# Patient Record
Sex: MALE | Race: BLACK OR AFRICAN AMERICAN | NOT HISPANIC OR LATINO | URBAN - METROPOLITAN AREA
[De-identification: names, ages, dates, MRNs, and addresses within clinical notes are randomized per-mention and may not be internally consistent; named-entity substitution may affect disease eponyms.]

---

## 2019-05-04 ENCOUNTER — OFFICE VISIT - HEALTHEAST (OUTPATIENT)
Dept: FAMILY MEDICINE | Facility: CLINIC | Age: 69
End: 2019-05-04

## 2019-05-04 DIAGNOSIS — M25.561 ACUTE PAIN OF RIGHT KNEE: ICD-10-CM

## 2019-06-12 ENCOUNTER — COMMUNICATION - HEALTHEAST (OUTPATIENT)
Dept: ADMINISTRATIVE | Facility: CLINIC | Age: 69
End: 2019-06-12

## 2020-01-01 ENCOUNTER — AMBULATORY - HEALTHEAST (OUTPATIENT)
Dept: VASCULAR SURGERY | Facility: CLINIC | Age: 70
End: 2020-01-01

## 2020-01-01 ENCOUNTER — RECORDS - HEALTHEAST (OUTPATIENT)
Dept: ADMINISTRATIVE | Facility: OTHER | Age: 70
End: 2020-01-01

## 2020-01-01 ENCOUNTER — COMMUNICATION - HEALTHEAST (OUTPATIENT)
Dept: SURGERY | Facility: HOSPITAL | Age: 70
End: 2020-01-01

## 2020-01-01 ENCOUNTER — COMMUNICATION - HEALTHEAST (OUTPATIENT)
Dept: VASCULAR SURGERY | Facility: CLINIC | Age: 70
End: 2020-01-01

## 2020-01-01 ENCOUNTER — AMBULATORY - HEALTHEAST (OUTPATIENT)
Dept: CARE COORDINATION | Facility: CLINIC | Age: 70
End: 2020-01-01

## 2020-01-01 ENCOUNTER — COMMUNICATION - HEALTHEAST (OUTPATIENT)
Dept: NURSING | Facility: CLINIC | Age: 70
End: 2020-01-01

## 2020-01-01 ENCOUNTER — ANESTHESIA - HEALTHEAST (OUTPATIENT)
Dept: INTENSIVE CARE | Facility: CLINIC | Age: 70
End: 2020-01-01

## 2020-01-01 ENCOUNTER — COMMUNICATION - HEALTHEAST (OUTPATIENT)
Dept: INTERNAL MEDICINE | Facility: CLINIC | Age: 70
End: 2020-01-01

## 2020-01-01 ENCOUNTER — ANESTHESIA - HEALTHEAST (OUTPATIENT)
Dept: SURGERY | Facility: HOSPITAL | Age: 70
End: 2020-01-01

## 2020-01-01 ENCOUNTER — OFFICE VISIT - HEALTHEAST (OUTPATIENT)
Dept: INTERNAL MEDICINE | Facility: CLINIC | Age: 70
End: 2020-01-01

## 2020-01-01 ENCOUNTER — COMMUNICATION - HEALTHEAST (OUTPATIENT)
Dept: SCHEDULING | Facility: CLINIC | Age: 70
End: 2020-01-01

## 2020-01-01 ENCOUNTER — AMBULATORY - HEALTHEAST (OUTPATIENT)
Dept: FAMILY MEDICINE | Facility: CLINIC | Age: 70
End: 2020-01-01

## 2020-01-01 ENCOUNTER — OFFICE VISIT - HEALTHEAST (OUTPATIENT)
Dept: FAMILY MEDICINE | Facility: CLINIC | Age: 70
End: 2020-01-01

## 2020-01-01 ENCOUNTER — AMBULATORY - HEALTHEAST (OUTPATIENT)
Dept: INTENSIVE CARE | Facility: CLINIC | Age: 70
End: 2020-01-01

## 2020-01-01 ENCOUNTER — OFFICE VISIT - HEALTHEAST (OUTPATIENT)
Dept: VASCULAR SURGERY | Facility: CLINIC | Age: 70
End: 2020-01-01

## 2020-01-01 ENCOUNTER — RECORDS - HEALTHEAST (OUTPATIENT)
Dept: INTENSIVE CARE | Facility: CLINIC | Age: 70
End: 2020-01-01

## 2020-01-01 ENCOUNTER — SURGERY - HEALTHEAST (OUTPATIENT)
Dept: SURGERY | Facility: HOSPITAL | Age: 70
End: 2020-01-01

## 2020-01-01 ENCOUNTER — COMMUNICATION - HEALTHEAST (OUTPATIENT)
Dept: FAMILY MEDICINE | Facility: CLINIC | Age: 70
End: 2020-01-01

## 2020-01-01 ENCOUNTER — HOME CARE/HOSPICE - HEALTHEAST (OUTPATIENT)
Dept: HOME HEALTH SERVICES | Facility: HOME HEALTH | Age: 70
End: 2020-01-01

## 2020-01-01 ENCOUNTER — AMBULATORY - HEALTHEAST (OUTPATIENT)
Dept: SURGERY | Facility: HOSPITAL | Age: 70
End: 2020-01-01

## 2020-01-01 ENCOUNTER — SURGERY - HEALTHEAST (OUTPATIENT)
Dept: GASTROENTEROLOGY | Facility: CLINIC | Age: 70
End: 2020-01-01

## 2020-01-01 ENCOUNTER — COMMUNICATION - HEALTHEAST (OUTPATIENT)
Dept: CARE COORDINATION | Facility: CLINIC | Age: 70
End: 2020-01-01

## 2020-01-01 ENCOUNTER — RECORDS - HEALTHEAST (OUTPATIENT)
Dept: VASCULAR ULTRASOUND | Facility: CLINIC | Age: 70
End: 2020-01-01

## 2020-01-01 DIAGNOSIS — Z99.2 ESRD NEEDING DIALYSIS (H): ICD-10-CM

## 2020-01-01 DIAGNOSIS — E11.22 TYPE 2 DIABETES MELLITUS WITH STAGE 5 CHRONIC KIDNEY DISEASE NOT ON CHRONIC DIALYSIS, WITHOUT LONG-TERM CURRENT USE OF INSULIN (H): ICD-10-CM

## 2020-01-01 DIAGNOSIS — N25.81 HYPERPARATHYROIDISM DUE TO RENAL INSUFFICIENCY (H): ICD-10-CM

## 2020-01-01 DIAGNOSIS — T82.858A STENOSIS OF OTHER VASCULAR PROSTHETIC DEVICES, IMPLANTS AND GRAFTS, INITIAL ENCOUNTER (H): ICD-10-CM

## 2020-01-01 DIAGNOSIS — N13.8 BENIGN PROSTATIC HYPERPLASIA WITH URINARY OBSTRUCTION: ICD-10-CM

## 2020-01-01 DIAGNOSIS — N18.6 ESRD NEEDING DIALYSIS (H): ICD-10-CM

## 2020-01-01 DIAGNOSIS — R04.2 HEMOPTYSIS: ICD-10-CM

## 2020-01-01 DIAGNOSIS — D63.1 ANEMIA OF CHRONIC RENAL FAILURE, STAGE 5 (H): ICD-10-CM

## 2020-01-01 DIAGNOSIS — N18.6 ESRD (END STAGE RENAL DISEASE) (H): ICD-10-CM

## 2020-01-01 DIAGNOSIS — N18.5 TYPE 2 DIABETES MELLITUS WITH STAGE 5 CHRONIC KIDNEY DISEASE NOT ON CHRONIC DIALYSIS, WITHOUT LONG-TERM CURRENT USE OF INSULIN (H): ICD-10-CM

## 2020-01-01 DIAGNOSIS — I25.10 ARTERIOSCLEROSIS OF CORONARY ARTERY: ICD-10-CM

## 2020-01-01 DIAGNOSIS — Z99.2 DEPENDENCE ON RENAL DIALYSIS (H): ICD-10-CM

## 2020-01-01 DIAGNOSIS — I51.89 DIASTOLIC DYSFUNCTION: ICD-10-CM

## 2020-01-01 DIAGNOSIS — N18.5 ANEMIA OF CHRONIC RENAL FAILURE, STAGE 5 (H): ICD-10-CM

## 2020-01-01 DIAGNOSIS — N18.5 HYPERTENSIVE KIDNEY DISEASE, STAGE V (H): ICD-10-CM

## 2020-01-01 DIAGNOSIS — Z22.7 LATENT TUBERCULOSIS: ICD-10-CM

## 2020-01-01 DIAGNOSIS — N18.6 END STAGE RENAL DISEASE (H): ICD-10-CM

## 2020-01-01 DIAGNOSIS — Z01.818 ENCOUNTER FOR OTHER PREPROCEDURAL EXAMINATION: ICD-10-CM

## 2020-01-01 DIAGNOSIS — I12.0 HYPERTENSIVE KIDNEY DISEASE, STAGE V (H): ICD-10-CM

## 2020-01-01 DIAGNOSIS — H40.003 GLAUCOMA SUSPECT, BILATERAL: ICD-10-CM

## 2020-01-01 DIAGNOSIS — N40.1 BENIGN PROSTATIC HYPERPLASIA WITH URINARY OBSTRUCTION: ICD-10-CM

## 2020-01-01 DIAGNOSIS — N18.5 CHRONIC KIDNEY DISEASE, STAGE 5 (H): ICD-10-CM

## 2020-01-01 DIAGNOSIS — K59.09 OTHER CONSTIPATION: ICD-10-CM

## 2020-01-01 DIAGNOSIS — Z99.2: ICD-10-CM

## 2020-01-01 DIAGNOSIS — I95.1 ORTHOSTATIC HYPOTENSION: ICD-10-CM

## 2020-01-01 DIAGNOSIS — I12.0 HYPERTENSIVE CHRONIC KIDNEY DISEASE WITH STAGE 5 CHRONIC KIDNEY DISEASE OR END STAGE RENAL DISEASE (H): ICD-10-CM

## 2020-01-01 DIAGNOSIS — Z11.59 ENCOUNTER FOR SCREENING FOR OTHER VIRAL DISEASES: ICD-10-CM

## 2020-01-01 DIAGNOSIS — R73.9 HYPERGLYCEMIA: ICD-10-CM

## 2020-01-01 DIAGNOSIS — Z01.818 PREOP GENERAL PHYSICAL EXAM: ICD-10-CM

## 2020-01-01 DIAGNOSIS — N25.81 SECONDARY HYPERPARATHYROIDISM OF RENAL ORIGIN (H): ICD-10-CM

## 2020-01-01 DIAGNOSIS — R01.1 HEART MURMUR: ICD-10-CM

## 2020-01-01 DIAGNOSIS — R94.31 ABNORMAL ELECTROCARDIOGRAM: ICD-10-CM

## 2020-01-01 DIAGNOSIS — D63.1 ANEMIA IN CHRONIC KIDNEY DISEASE (CODE): ICD-10-CM

## 2020-01-01 DIAGNOSIS — I25.10 ATHEROSCLEROTIC HEART DISEASE OF NATIVE CORONARY ARTERY WITHOUT ANGINA PECTORIS: ICD-10-CM

## 2020-01-01 DIAGNOSIS — E87.20 METABOLIC ACIDOSIS: ICD-10-CM

## 2020-01-01 DIAGNOSIS — E83.39 HYPERPHOSPHATEMIA: ICD-10-CM

## 2020-01-01 DIAGNOSIS — R00.2 PALPITATIONS: ICD-10-CM

## 2020-01-01 DIAGNOSIS — Z20.822 ENCOUNTER FOR LABORATORY TESTING FOR COVID-19 VIRUS: ICD-10-CM

## 2020-01-01 DIAGNOSIS — N18.5 STAGE 5 CHRONIC KIDNEY DISEASE (H): ICD-10-CM

## 2020-01-01 DIAGNOSIS — E86.1 HYPOTENSION DUE TO HYPOVOLEMIA: ICD-10-CM

## 2020-01-01 DIAGNOSIS — R91.8 MASS OF LOWER LOBE OF LEFT LUNG: ICD-10-CM

## 2020-01-01 LAB
ALBUMIN SERPL-MCNC: 3.2 G/DL (ref 3.5–5)
ALBUMIN UR-MCNC: ABNORMAL MG/DL
ALP SERPL-CCNC: 88 U/L (ref 45–120)
ALT SERPL W P-5'-P-CCNC: 15 U/L (ref 0–45)
ANION GAP SERPL CALCULATED.3IONS-SCNC: 13 MMOL/L (ref 5–18)
APPEARANCE UR: CLEAR
AST SERPL W P-5'-P-CCNC: 12 U/L (ref 0–40)
ATRIAL RATE - MUSE: 76 BPM
BACTERIA #/AREA URNS HPF: ABNORMAL HPF
BILIRUB SERPL-MCNC: 0.5 MG/DL (ref 0–1)
BILIRUB UR QL STRIP: NEGATIVE
BUN SERPL-MCNC: 106 MG/DL (ref 8–28)
CALCIUM SERPL-MCNC: 6.8 MG/DL (ref 8.5–10.5)
CHLORIDE BLD-SCNC: 105 MMOL/L (ref 98–107)
CO2 SERPL-SCNC: 16 MMOL/L (ref 22–31)
COLOR UR AUTO: YELLOW
CREAT SERPL-MCNC: 7.85 MG/DL (ref 0.7–1.3)
DIASTOLIC BLOOD PRESSURE - MUSE: NORMAL
FASTING STATUS PATIENT QL REPORTED: ABNORMAL
GFR SERPL CREATININE-BSD FRML MDRD: 7 ML/MIN/1.73M2
GLUCOSE BLD-MCNC: 114 MG/DL (ref 70–125)
GLUCOSE BLD-MCNC: 134 MG/DL (ref 74–125)
GLUCOSE UR STRIP-MCNC: ABNORMAL MG/DL
HGB UR QL STRIP: ABNORMAL
INTERPRETATION ECG - MUSE: NORMAL
KETONES UR STRIP-MCNC: NEGATIVE MG/DL
LEUKOCYTE ESTERASE UR QL STRIP: NEGATIVE
NITRATE UR QL: NEGATIVE
P AXIS - MUSE: 61 DEGREES
PH UR STRIP: 6 [PH] (ref 5–8)
POTASSIUM BLD-SCNC: 5.2 MMOL/L (ref 3.5–5)
PR INTERVAL - MUSE: 192 MS
PROT SERPL-MCNC: 6.5 G/DL (ref 6–8)
QRS DURATION - MUSE: 80 MS
QT - MUSE: 392 MS
QTC - MUSE: 441 MS
R AXIS - MUSE: -33 DEGREES
RBC #/AREA URNS AUTO: ABNORMAL HPF
SODIUM SERPL-SCNC: 134 MMOL/L (ref 136–145)
SP GR UR STRIP: 1.01 (ref 1–1.03)
SQUAMOUS #/AREA URNS AUTO: ABNORMAL LPF
SYSTOLIC BLOOD PRESSURE - MUSE: NORMAL
T AXIS - MUSE: 89 DEGREES
TSH SERPL DL<=0.005 MIU/L-ACNC: 2.97 UIU/ML (ref 0.3–5)
UROBILINOGEN UR STRIP-ACNC: ABNORMAL
VENTRICULAR RATE- MUSE: 76 BPM
WBC #/AREA URNS AUTO: ABNORMAL HPF

## 2020-01-01 RX ORDER — ACETAMINOPHEN 500 MG
500-1000 TABLET ORAL EVERY 6 HOURS PRN
Status: SHIPPED | COMMUNITY
Start: 2008-12-05

## 2020-01-01 RX ORDER — FERROUS SULFATE 325(65) MG
1 TABLET ORAL
Qty: 60 TABLET | Refills: 3 | Status: SHIPPED | OUTPATIENT
Start: 2020-01-01

## 2020-01-01 RX ORDER — FINASTERIDE 5 MG/1
5 TABLET, FILM COATED ORAL DAILY
Qty: 90 TABLET | Refills: 3 | Status: SHIPPED | OUTPATIENT
Start: 2020-01-01

## 2020-01-01 RX ORDER — PANTOPRAZOLE SODIUM 40 MG/1
1 TABLET, DELAYED RELEASE ORAL DAILY
Status: SHIPPED | COMMUNITY
Start: 2020-01-01

## 2020-01-01 RX ORDER — MIDODRINE HYDROCHLORIDE 5 MG/1
5 TABLET ORAL DAILY PRN
Status: SHIPPED | COMMUNITY
Start: 2020-01-01

## 2020-01-01 RX ORDER — AZELASTINE 1 MG/ML
1 SPRAY, METERED NASAL 2 TIMES DAILY
Status: SHIPPED | COMMUNITY
Start: 2019-02-06

## 2020-01-01 RX ORDER — TAMSULOSIN HYDROCHLORIDE 0.4 MG/1
0.8 CAPSULE ORAL
Refills: 3 | Status: SHIPPED | COMMUNITY
Start: 2019-04-15

## 2020-01-01 RX ORDER — ATORVASTATIN CALCIUM 40 MG/1
40 TABLET, FILM COATED ORAL EVERY EVENING
Qty: 90 TABLET | Refills: 3 | Status: SHIPPED | OUTPATIENT
Start: 2020-01-01

## 2020-01-01 RX ORDER — ASPIRIN 81 MG/1
81 TABLET, CHEWABLE ORAL DAILY
Qty: 30 TABLET | Refills: 11 | Status: SHIPPED | OUTPATIENT
Start: 2020-01-01

## 2020-01-01 ASSESSMENT — MIFFLIN-ST. JEOR
SCORE: 1583.16
SCORE: 1598.58
SCORE: 1602.66
SCORE: 1584
SCORE: 1588.6
SCORE: 1585.43
SCORE: 1402.63

## 2021-05-27 VITALS
RESPIRATION RATE: 18 BRPM | SYSTOLIC BLOOD PRESSURE: 90 MMHG | HEART RATE: 90 BPM | DIASTOLIC BLOOD PRESSURE: 52 MMHG | TEMPERATURE: 98.2 F

## 2021-05-28 NOTE — PATIENT INSTRUCTIONS - HE
Take Tylenol as needed for pain.      You will be called to schedule an appointment with Orthopedics.

## 2021-05-28 NOTE — PROGRESS NOTES
Chief Complaint   Patient presents with     poss Knee pain     x4days knee pain          HPI      Patient is here for 4 days of moderate right knee pain with swelling. No fever, chills, redness of knee, injury to knee. Pain only comes with walking. No pain at rest. He took Tylenol with some relief.    ROS: Pertinent ROS noted in HPI.     No Known Allergies    There is no problem list on file for this patient.      No family history on file.    Social History     Socioeconomic History     Marital status:      Spouse name: Not on file     Number of children: Not on file     Years of education: Not on file     Highest education level: Not on file   Occupational History     Not on file   Social Needs     Financial resource strain: Not on file     Food insecurity:     Worry: Not on file     Inability: Not on file     Transportation needs:     Medical: Not on file     Non-medical: Not on file   Tobacco Use     Smoking status: Not on file   Substance and Sexual Activity     Alcohol use: Not on file     Drug use: Not on file     Sexual activity: Not on file   Lifestyle     Physical activity:     Days per week: Not on file     Minutes per session: Not on file     Stress: Not on file   Relationships     Social connections:     Talks on phone: Not on file     Gets together: Not on file     Attends Quaker service: Not on file     Active member of club or organization: Not on file     Attends meetings of clubs or organizations: Not on file     Relationship status: Not on file     Intimate partner violence:     Fear of current or ex partner: Not on file     Emotionally abused: Not on file     Physically abused: Not on file     Forced sexual activity: Not on file   Other Topics Concern     Not on file   Social History Narrative     Not on file         Objective:    Vitals:    05/04/19 1222   BP: 132/75   Pulse: (!) 54   Resp: 18   Temp: 98.3  F (36.8  C)   SpO2: 98%       Gen:NAD  CV: RRR, normal S1S2, no M, R, G  Pulm:  CTAB, normal effort  MSK: mild swelling of right knee. No significant effusion of right knee. No pain to palpation of right knee. No laxity nor crepitus of right knee. Full ROM of right hip and knee. 5/5 strength of right lower extremity. Mild limp on right leg.  Special test: negative anterior and posterior drawers, Mc[place] of right knee.   Neuro: 2+ patellar DTRs bilaterally     XR - no acute bony abnormalities per my interpretation, noted degenerative changes, and official read's comment on small joint effusion, discussed during visitv.     Xr Knee Right Plus Sunrise Vw    Result Date: 5/4/2019  EXAM: XR KNEE RIGHT PLUS SUNRISE VW LOCATION: [facility] DATE/TIME: 5/4/2019 12:50 PM INDICATION: Right knee pain. COMPARISON: None. FINDINGS: Advanced tricompartmental degenerative osteoarthritis most pronounced in the patellofemoral joint space. Small joint space effusion. No fracture nor malalignment.    Acute pain of right knee  -     XR Knee Right Plus Sunrise VW; Future  -     Knee brace  -     Ambulatory referral to Orthopedics      Advised OTC Tylenol. F/u with Ortho.

## 2021-06-03 VITALS — WEIGHT: 175 LBS

## 2021-06-04 VITALS
SYSTOLIC BLOOD PRESSURE: 156 MMHG | OXYGEN SATURATION: 100 % | TEMPERATURE: 98.2 F | WEIGHT: 149.8 LBS | DIASTOLIC BLOOD PRESSURE: 62 MMHG | HEART RATE: 79 BPM

## 2021-06-04 VITALS
OXYGEN SATURATION: 100 % | DIASTOLIC BLOOD PRESSURE: 66 MMHG | SYSTOLIC BLOOD PRESSURE: 114 MMHG | WEIGHT: 151.5 LBS | TEMPERATURE: 97.9 F | HEART RATE: 53 BPM

## 2021-06-04 VITALS
HEART RATE: 62 BPM | DIASTOLIC BLOOD PRESSURE: 65 MMHG | TEMPERATURE: 97.7 F | WEIGHT: 155 LBS | OXYGEN SATURATION: 99 % | SYSTOLIC BLOOD PRESSURE: 136 MMHG

## 2021-06-05 VITALS
TEMPERATURE: 98.3 F | HEART RATE: 92 BPM | DIASTOLIC BLOOD PRESSURE: 60 MMHG | BODY MASS INDEX: 23.57 KG/M2 | SYSTOLIC BLOOD PRESSURE: 100 MMHG | WEIGHT: 150.5 LBS

## 2021-06-05 VITALS
SYSTOLIC BLOOD PRESSURE: 76 MMHG | BODY MASS INDEX: 23.67 KG/M2 | DIASTOLIC BLOOD PRESSURE: 48 MMHG | TEMPERATURE: 97.6 F | WEIGHT: 151.1 LBS | HEART RATE: 80 BPM

## 2021-06-05 VITALS
TEMPERATURE: 98 F | DIASTOLIC BLOOD PRESSURE: 60 MMHG | SYSTOLIC BLOOD PRESSURE: 108 MMHG | HEART RATE: 48 BPM | BODY MASS INDEX: 24.69 KG/M2 | WEIGHT: 153 LBS | OXYGEN SATURATION: 99 %

## 2021-06-05 VITALS — BODY MASS INDEX: 24.01 KG/M2 | WEIGHT: 153 LBS | HEIGHT: 67 IN

## 2021-06-05 VITALS — BODY MASS INDEX: 23.22 KG/M2 | HEIGHT: 73 IN | WEIGHT: 175.2 LBS

## 2021-06-05 NOTE — PROGRESS NOTES
Subjective:   Terrence Zavala is a 70 y.o. male  Roomed by: ac    Accompanied by Daughter Fina [facility] - [phone] cell    services provided by: Family/Friend Daughter, waiver signed     Chief Complaint   Patient presents with     Headache     dizziness - states high BP and Blood sugar last night   Says he had stayed in [facility] from May until 1/16/2020. Denies any recent coughing, fever, chills, nasal congestion or runny nose. but admits some shortness of breath with exertion x 1 week. Says he ran out of all of his medication while in [facility]. Says was able to get refills until 2/5. Had a bad headache last night. His blood sugar was over 250 last night. Says BS is usually normal. Appetite has been normal. Admits being able to drink fluids and urinate normal amounts. Denies any dysuria. Says having some belly ache in the left lower belly. Says last BM was last night but was very hard. Denies any recent nausea, vomiting, belly pain, or diarrhea.     Review of Systems  See HPI for ROS, otherwise balance of other systems negative    Allergies   Allergen Reactions     Dorzolamide-Timolol Shortness Of Breath     Lisinopril Cough     Sulfa (Sulfonamide Antibiotics) Itching     Tramadol Itching     Hydrocodone-Acetaminophen Rash       Current Outpatient Medications:      acetaminophen (TYLENOL) 500 MG tablet, Take 1-2 tablets by mouth., Disp: , Rfl:      amLODIPine (NORVASC) 5 MG tablet, Take 5 mg by mouth., Disp: , Rfl:      aspirin 81 mg chewable tablet, Chew 1 tablet daily., Disp: , Rfl:      atorvastatin (LIPITOR) 40 MG tablet, Take 1 tablet by mouth daily., Disp: , Rfl:      azelastine (ASTELIN) 137 mcg (0.1 %) nasal spray, 1 spray into each nostril daily., Disp: , Rfl:      brimonidine (ALPHAGAN) 0.2 % ophthalmic solution, Administer 1 drop to both eyes 2 (two) times a day., Disp: , Rfl:      cyanocobalamin, vitamin B-12, 1,000 mcg/mL Drop, Take 1 tablet by mouth daily., Disp: , Rfl:      finasteride (PROSCAR)  5 mg tablet, Take 1 tablet by mouth daily., Disp: , Rfl:      fluticasone propionate (FLONASE) 50 mcg/actuation nasal spray, 2 sprays into each nostril daily., Disp: , Rfl:      latanoprost (XALATAN) 0.005 % ophthalmic solution, Apply 1 drop to eye daily., Disp: , Rfl:      metoprolol succinate (TOPROL-XL) 25 MG, Take 1 tablet by mouth daily., Disp: , Rfl:      pantoprazole (PROTONIX) 40 MG tablet, Take 1 tablet by mouth daily., Disp: , Rfl:      sodium bicarbonate 650 MG tablet, Take 650 mg by mouth daily., Disp: , Rfl:      tamsulosin (FLOMAX) 0.4 mg cap, Take 1 capsule by mouth daily., Disp: , Rfl: 3     Patient Active Problem List   Diagnosis     Arteriosclerosis of coronary artery     Benign prostatic hyperplasia with urinary obstruction     Diastolic dysfunction     Hyperparathyroidism due to renal insufficiency (H)     Stage 5 chronic kidney disease (H)     Hypertensive kidney disease, stage V (H)     Type 2 diabetes mellitus (H)     No past medical history on file. - if none on file, see Problem List    Objective:     Vitals:    02/08/20 1603   BP: 114/66   Patient Site: Right Arm   Patient Position: Sitting   Cuff Size: Adult Regular   Pulse: (!) 53   Temp: 97.9  F (36.6  C)   TempSrc: Oral   SpO2: 100%   Weight: 151 lb 8 oz (68.7 kg)   Gen - Pt in NAD  Eyes - Conjunctiva non injected, no drainage  Ears - external canals - no induration, Right TM - non injected, Left TM - non injected   Nose - not congested, no nasal drainage  Pharynx - not injected, tonsils 1+ size  Neck - supple, no cervical adenopathy, no masses  Cor - RRR w/o murmur  Lungs - Good air entry; no wheezes or crackles noted on auscultation - no coughing noted  Skin - no lesions, no rashes noted  Gen -Patient in no apparent distress  MS : Questionable bilateral CVA tenderness     Results for orders placed or performed in visit on 02/08/20   Urinalysis-UC if Indicated   Result Value Ref Range    Color, UA Yellow Colorless, Yellow, Straw, Light  Yellow    Clarity, UA Clear Clear    Glucose,  mg/dL (!) Negative    Bilirubin, UA Negative Negative    Ketones, UA Negative Negative    Specific Gravity, UA 1.015 1.005 - 1.030    Blood, UA Small (!) Negative    pH, UA 6.0 5.0 - 8.0    Protein,  mg/dL (!) Negative mg/dL    Urobilinogen, UA 0.2 E.U./dL 0.2 E.U./dL, 1.0 E.U./dL    Nitrite, UA Negative Negative    Leukocytes, UA Negative Negative    Bacteria, UA Few (!) None Seen hpf    RBC, UA 0-2 None Seen, 0-2 hpf    WBC, UA 0-5 None Seen, 0-5 hpf    Squam Epithel, UA 0-5 None Seen, 0-5 lpf   Glucose   Result Value Ref Range    Glucose 134 (H) 74 - 125 mg/dL    Patient Fasting > 8hrs? Unknown    UC not indicated  Lab result discussed on day of visit.     Assessment - Plan   Medical Decision Making -70-year-old Tajik only speaking gentleman is here with his daughter who is interpreting.  Of note is that patient has stage V chronic kidney disease, history of diabetes and hypertension.  His main concern is having high blood pressure, high glucose and having some recent headaches and dizziness.  Of note is that patient has spent several months in [facility] and recently returned to [place] on 1/16.  He denies any URI symptoms or cough.  He does admit some shortness of breath with exertion but no chest pain.  On exam he was afebrile and except for heart rate of 53 the balance of vital signs were stable.  His exam was unremarkable.  His blood sugar was 134 slightly elevated.  Urinalysis showed some mild glucose glucose urea, but nothing indicative of an infection.  CMP was pending.  Reassured patient and daughter that patient's blood pressure was within normal limits in clinic and his blood sugar was reassuring.  Patient is to follow-up with primary care within by 2/14.  Told patient I would call his daughter with the results of the CMP tomorrow.    1. Hypertensive kidney disease, stage V (H)  - amLODIPine (NORVASC) 5 MG tablet; Take 5 mg by mouth.  -  acetaminophen (TYLENOL) 500 MG tablet; Take 1-2 tablets by mouth.  - Comprehensive Metabolic Panel  - Urinalysis-UC if Indicated    2. Hyperglycemia at home  - Glucose    3. Type 2 diabetes mellitus with stage 5 chronic kidney disease not on chronic dialysis, without long-term current use of insulin (H)    At the conclusion of the encounter, assessment and plan were discussed.   All questions were answered.   The patient or guardian acknowledged understanding and was involved in the decision making regarding the overall care plan.    Patient Instructions   1. Make an appointment for follow up with either Internal Medicine or Family Medicine provider by 2/14/2020  2. Keep well hydrated  3. If symptoms are getting worse over time or you have any questions, call the clinic number - it's answered 24/7

## 2021-06-05 NOTE — TELEPHONE ENCOUNTER
See separate telephone message from today.  Talk to daughter at 3:15 PM and told her that her dad's chronic renal failure has worsened a lot in the past 14 months.  Recommended that he be evaluated in 1 of our emergency departments and she will bring him most likely to [facility].   ambulatory

## 2021-06-05 NOTE — PATIENT INSTRUCTIONS - HE
1. Make an appointment for follow up with either Internal Medicine or Family Medicine provider by 2/14/2020  2. Keep well hydrated  3. If symptoms are getting worse over time or you have any questions, call the clinic number - it's answered 24/7

## 2021-06-05 NOTE — TELEPHONE ENCOUNTER
Spoke with patient's daughter at 3:15 PM and told her that her dad's chronic renal failure has worsened a lot in the past 14 months.  Told her the specifics of his BUN and creatinine.  Explained that his potassium was not critically high at 5.2.  Recommended that he be evaluated in 1 of our emergency departments and she will bring him most likely to [facility]

## 2021-06-05 NOTE — TELEPHONE ENCOUNTER
Phoned patient again at patient's daughter, Fina,  again at [phone].  Left a voicemail to have her call us back at [phone].

## 2021-06-05 NOTE — TELEPHONE ENCOUNTER
Critical lab value called from: Helen at [facility]  Critical lab result: Creatinine 7.85   Lab drawn time: 1625 02/08/20  Ordered by Provider: Dr. Antonio   Related Dx: Hypertensive kidney disease, stage V - chronic kidney disease  Previous Creatinine labs: 5.06 on 4/23/2019  Pt was seen in the clinic today.   No PCP noted in chart.   WIC is closed.   6:04 PM Via [employer] . Call pt. No . Left a vm for pt to return call.   When pt returns call, need to provide critical lab result.     Reason for Disposition    Lab or radiology calling with CRITICAL test results    Protocols used: PCP CALL - NO TRIAGE-A-    Iraida Younger RN Triage Nurse Advisor 6:10 PM

## 2021-06-05 NOTE — TELEPHONE ENCOUNTER
Phoned patient's daughter, Fina, at [phone] cell and left a voicemail to call us back at [phone] to discuss her dad's labs from the 2/8.
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
## 2021-06-06 NOTE — TELEPHONE ENCOUNTER
Who is calling:  Patient daughter   Reason for Call:  Caller states patient insurance will not cover  epoetin aditi (EPOGEN,PROCRIT) 4,000 unit/mL injection at [facility] it cost over 1300 $ they cannot afford patient insurance will cover for injection  if he gets at clinic. Please call patient daughter with information .  Date of last appointment with primary care: 02/20/20  Okay to leave a detailed message: No
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
## 2021-06-07 NOTE — PROGRESS NOTES
Clinic Care Coordination Contact  Community Health Worker Initial Outreach    The clinic Community Health Worker talked with the patient today at the request of the PCP to discuss possible Clinic Care Coordination enrollment.  The service was described to the patient and immediate needs were discussed.  The patient declined enrollement at this time.  The PCP is encouraged to refer in the future if the patient's needs change.    Patient accepts CC: No, Patient said he is not wanting to enroll at this time because he has been working with  at Dialysis clinic.

## 2021-06-07 NOTE — PROGRESS NOTES
Clinic Care Coordination Contact  PC to Terrence, he was busy getting ready for dialysis. I spoke with patients daughter Fina. Fina asked for me to call Terrence back today at 3:00 to discuss CCC with him.     Order Summary [[id number]]   Procedure: Ambulatory referral to Care Management (Primary Care) Status: Needs Scheduling   Requested appt date:   Authorizing: Jules Younger MD in [facility]   Referral: [id number] (Pending Review)       Diagnosis: ESRD needing dialysis (H) [N18.6, Z99.2]   Comments   Patient with end stage renal disease. Was admitted directly by nephrologist. CCC for coordination of care with resources. Family involved.

## 2021-06-07 NOTE — PROGRESS NOTES
Clinic Care Coordination Contact  [facility]/Voicemail    Clinical Data: Care Coordinator Outreach  Outreach attempted x 1.  Left message on patient's voicemail with call back information and requested return call.  Plan: Care Coordinator will try to reach patient again in 1-2 business days.

## 2021-06-07 NOTE — PROGRESS NOTES
Clinic Care Coordination Contact  Community Health Worker Initial Outreach    The Clinic Community Health Worker spoke with the patient today to discuss possible Clinic Care Coordination enrollment.  The service was described to the patient and immediate needs were discussed.  The patient agreed to enrollment and an assessment was scheduled.  The patient was provided with contact information for the clinic CHW.             Phone SW Assessment date: 4/23/20    CHW Initial Information Gathering:  Referral Source: PCP  Preferred Hospital: [facility]  [phone]  Current living arrangement:: I live in a private home with family  Informal Support system:: Children, Family  No PCP office visit in Past Year: Yes(Last PCP attp was 4/16/20)  Transportation means:: Family    Patient accepts CC: Yes
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
## 2021-06-07 NOTE — TELEPHONE ENCOUNTER
Telephone visit just completed. Nothing further needed.  Tiarra Moura CMA ............... 1:58 PM, 04/16/20

## 2021-06-07 NOTE — PROGRESS NOTES
Clinic Care Coordination Contact  Community Health Worker Initial Outreach    PC to patient and spoke with patients daughter Fina. Patient is currently in Dialasis. CHW will call patient back on 4/21/20 @ 2:00.

## 2021-06-07 NOTE — TELEPHONE ENCOUNTER
Upcoming Appointment Question  When is the appointment: Today  What is your appointment for?: renal failure .  Who is your appointment scheduled with?: PCP only  What is your question/concern?: caller states they received a call from clinic by the she answer the phone it got cut off caller is wondering if it is providers virtual visit call patient is scheduled today at 2 pm. Please call patient .  Okay to leave a detailed message?: No
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
## 2021-06-07 NOTE — PROGRESS NOTES
Clinic Care Coordination Contact  [facility]/Voicemail       Clinical Data: Care Coordinator Outreach  Outreach attempted x 1.  Left message on Daughter 's voicemail with call back information and requested return call to CHW to reschedule Initial Assessment.    Plan:     Pt was a no show for CC initial visit.  Please reschedule at next outreach if needed.

## 2021-06-07 NOTE — TELEPHONE ENCOUNTER
RN cannot approve Refill Request    RN can NOT refill this medication med is not covered by policy/route to provider   . Last office visit: 3/12/2020 Jules Younger MD Last Physical: Visit date not found Last MTM visit: Visit date not found Last visit same specialty: 3/12/2020 Jules Younger MD.  Next visit within 3 mo: Visit date not found  Next physical within 3 mo: Visit date not found      Dipti Farah, Care Connection Triage/Med Refill 4/16/2020    Requested Prescriptions   Pending Prescriptions Disp Refills     latanoprost (XALATAN) 0.005 % ophthalmic solution 2.5 mL 0     Sig: Apply 1 drop to eye daily.       There is no refill protocol information for this order

## 2021-06-07 NOTE — TELEPHONE ENCOUNTER
RN Triage  Terrence's daughter is calling this evening. Terrence had been admitted to the hospital where he had a dialysis line placed in his chest yesterday at [facility]. Terrence was discharged today at 1730. His daughter is calling now because she went to help Terrence this evening and he was very sleepy, he took a nap and now she was having a difficult time fully awakening him and he is acting confused. She states that one side of his mouth is drooping and he is drooling some.    I advised Fina to hang up the phone and immediately call 911. Terrence should be evaluated immediately. Fina understood this plan.    Ellie Hodges RN  [facility] Nurse Advisor    Reason for Disposition    [1] Weakness (i.e., paralysis, loss of muscle strength) of the face, arm / hand, or leg / foot on one side of the body AND [2] sudden onset AND [3] present now    Difficult to awaken or acting confused (e.g., disoriented, slurred speech)    Protocols used: NEUROLOGIC DEFICIT-A-AH

## 2021-06-07 NOTE — TELEPHONE ENCOUNTER
Medication: Finasteride 5 mg   Pharmacy: [facility] in [place]  Last OV: 4/16/20 with Dr. Younger  Last refill: 3/6/19 authorized by Historical Provider    MIRACLE Grissom

## 2021-06-07 NOTE — TELEPHONE ENCOUNTER
RN cannot approve Refill Request    RN can NOT refill this medication med is not covered by policy/route to provider     . Last office visit: 3/12/2020 Jules Younger MD Last Physical: Visit date not found Last MTM visit: Visit date not found Last visit same specialty: 3/12/2020 Jules Younger MD.  Next visit within 3 mo: Visit date not found  Next physical within 3 mo: Visit date not found      Dipti Farah, [facility] Connection Triage/Med Refill 4/16/2020    Requested Prescriptions   Pending Prescriptions Disp Refills     brimonidine (ALPHAGAN) 0.2 % ophthalmic solution 5 mL 0     Sig: Administer 1 drop to both eyes 2 (two) times a day.       There is no refill protocol information for this order

## 2021-06-08 NOTE — TELEPHONE ENCOUNTER
Refill Request  Did you contact pharmacy: No  Medication name:   Requested Prescriptions     Pending Prescriptions Disp Refills     carvediloL (COREG) 6.25 MG tablet 60 tablet 0     Sig: Take 1 tablet (6.25 mg total) by mouth 2 (two) times a day with meals.     amLODIPine (NORVASC) 10 MG tablet 30 tablet 0     Sig: Take 1 tablet (10 mg total) by mouth daily.     Who prescribed the medication: Jules Younger MD   Requested Pharmacy: [facility]  Is patient out of medication: Yes  Patient notified refills processed in 3 business days:  no  Okay to leave a detailed message: yes

## 2021-06-08 NOTE — TELEPHONE ENCOUNTER
RN cannot approve Refill Request    RN can NOT refill this medication med is not covered by policy/route to provider       . Last office visit: 3/12/2020 Jules Younger MD Last Physical: Visit date not found Last MTM visit: Visit date not found Last visit same specialty: 3/12/2020 Jules Younger MD.  Next visit within 3 mo: Visit date not found  Next physical within 3 mo: Visit date not found      Dipti Farah, [facility] Connection Triage/Med Refill 6/17/2020    Requested Prescriptions   Pending Prescriptions Disp Refills     latanoprost (XALATAN) 0.005 % ophthalmic solution [Pharmacy Med Name: LATANOPROST 0.005% SOLN] 2.5 mL 0     Sig: PLACE 1 DROP INTO THE AFFECTED EYE(S) ONCE DAILY       There is no refill protocol information for this order        brimonidine (ALPHAGAN) 0.2 % ophthalmic solution [Pharmacy Med Name: BRIMONIDINE TARTRATE 0.2% SOLN] 5 mL 0     Sig: PLACE 1 DROP INTO BOTH EYES TWICE DAILY       There is no refill protocol information for this order

## 2021-06-08 NOTE — TELEPHONE ENCOUNTER
Refill Approved    Rx renewed per Medication Renewal Policy. Medication was last renewed on 4/15/20.    Dipti Farah, [facility] Connection Triage/Med Refill 5/18/2020     Requested Prescriptions   Pending Prescriptions Disp Refills     carvediloL (COREG) 6.25 MG tablet 60 tablet 0     Sig: Take 1 tablet (6.25 mg total) by mouth 2 (two) times a day with meals.       Beta-Blockers Refill Protocol Passed - 5/18/2020  1:32 PM        Passed - PCP or prescribing provider visit in past 12 months or next 3 months     Last office visit with prescriber/PCP: 3/12/2020 Jules Younger MD OR same dept: Visit date not found OR same specialty: 3/12/2020 Jules Younger MD  Last physical: Visit date not found Last MTM visit: Visit date not found   Next visit within 3 mo: Visit date not found  Next physical within 3 mo: Visit date not found  Prescriber OR PCP: Jules Younger MD  Last diagnosis associated with med order: 1. ESRD needing dialysis (H)  - carvediloL (COREG) 6.25 MG tablet; Take 1 tablet (6.25 mg total) by mouth 2 (two) times a day with meals.  Dispense: 60 tablet; Refill: 0  - amLODIPine (NORVASC) 10 MG tablet; Take 1 tablet (10 mg total) by mouth daily.  Dispense: 30 tablet; Refill: 0    2. Hypertensive kidney disease, stage V (H)  - carvediloL (COREG) 6.25 MG tablet; Take 1 tablet (6.25 mg total) by mouth 2 (two) times a day with meals.  Dispense: 60 tablet; Refill: 0  - amLODIPine (NORVASC) 10 MG tablet; Take 1 tablet (10 mg total) by mouth daily.  Dispense: 30 tablet; Refill: 0    If protocol passes may refill for 12 months if within 3 months of last provider visit (or a total of 15 months).             Passed - Blood pressure filed in past 12 months     BP Readings from Last 1 Encounters:   04/14/20 151/88                amLODIPine (NORVASC) 10 MG tablet 30 tablet 0     Sig: Take 1 tablet (10 mg total) by mouth daily.       Calcium-Channel Blockers Protocol Passed - 5/18/2020  1:32 PM        Passed - PCP or  prescribing provider visit in past 12 months or next 3 months     Last office visit with prescriber/PCP: 3/12/2020 Jules Younger MD OR same dept: Visit date not found OR same specialty: 3/12/2020 Jules Younger MD  Last physical: Visit date not found Last MTM visit: Visit date not found   Next visit within 3 mo: Visit date not found  Next physical within 3 mo: Visit date not found  Prescriber OR PCP: Jules Younger MD  Last diagnosis associated with med order: 1. ESRD needing dialysis (H)  - carvediloL (COREG) 6.25 MG tablet; Take 1 tablet (6.25 mg total) by mouth 2 (two) times a day with meals.  Dispense: 60 tablet; Refill: 0  - amLODIPine (NORVASC) 10 MG tablet; Take 1 tablet (10 mg total) by mouth daily.  Dispense: 30 tablet; Refill: 0    2. Hypertensive kidney disease, stage V (H)  - carvediloL (COREG) 6.25 MG tablet; Take 1 tablet (6.25 mg total) by mouth 2 (two) times a day with meals.  Dispense: 60 tablet; Refill: 0  - amLODIPine (NORVASC) 10 MG tablet; Take 1 tablet (10 mg total) by mouth daily.  Dispense: 30 tablet; Refill: 0    If protocol passes may refill for 12 months if within 3 months of last provider visit (or a total of 15 months).             Passed - Blood pressure filed in past 12 months     BP Readings from Last 1 Encounters:   04/14/20 151/88

## 2021-06-09 NOTE — TELEPHONE ENCOUNTER
Refill request received from -[facility] via fax for a refill of Atorvastatin 40 mg. Order pended.    Last OV/VV on 4/16/2020  Next scheduled appointment with PCP 8/11/2020

## 2021-06-10 NOTE — TELEPHONE ENCOUNTER
Refill request received from -[facility] via fax for a refill of Ferrous Sulfate 325 mg. Order pended.    Last OV/VV on 4/16/2020  Next scheduled appointment with PCP None

## 2021-06-10 NOTE — TELEPHONE ENCOUNTER
All surgery instructions discussed and follow-up scheduled. Also mailed AVS with all information from 8/13 VV.

## 2021-06-10 NOTE — TELEPHONE ENCOUNTER
For the past 2 weeks he has had diarrhea, but it is worse today, nonstop. Today after dialysis he has had watery diarrhea x4 times since 3pm. He is feeling weak.     Additional Information    Negative: Shock suspected (e.g., cold/pale/clammy skin, too weak to stand, low BP, rapid pulse)    Negative: Difficult to awaken or acting confused (e.g., disoriented, slurred speech)    Negative: Sounds like a life-threatening emergency to the triager    Negative: Vomiting also present and worse than the diarrhea    Negative: [1] Blood in stool AND [2] without diarrhea    Negative: Diarrhea in a cancer patient who is currently (or recently) receiving chemotherapy or radiation therapy, or cancer patient who has metastatic or end-stage cancer and is receiving palliative care    Negative: [1] SEVERE abdominal pain (e.g., excruciating) AND [2] present > 1 hour    Negative: [1] SEVERE abdominal pain AND [2] age > 60    Negative: [1] Blood in the stool AND [2] moderate or large amount of blood    Negative: Black or tarry bowel movements  (Exception: chronic-unchanged  black-grey bowel movements AND is taking iron pills or Pepto-bismol)    Negative: [1] Drinking very little AND [2] dehydration suspected (e.g., no urine > 12 hours, very dry mouth, very lightheaded)    Negative: Patient sounds very sick or weak to the triager    [1] SEVERE diarrhea (e.g., 7 or more times / day more than normal) AND [2] age > 60 years    Protocols used: DIARRHEA-A-AH    Triaged to a disposition of See HCP within 4 hrs. Daughter intends to bring him to the ER now.   COVID 19 Nurse Triage Plan/Patient Instructions    Please be aware that novel coronavirus (COVID-19) may be circulating in the community. If you develop symptoms such as fever, cough, or SOB or if you have concerns about the presence of another infection including coronavirus (COVID-19), please contact your health care provider or visit [email or web address].     Disposition/Instructions    ED Visit  recommended. Follow protocol based instructions.      Bring Your Own Device:  Please also bring your smart device(s) (smart phones, tablets, laptops) and their charging cables for your personal use and to communicate with your care team during your visit.      Thank you for taking steps to prevent the spread of this virus.  o Limit your contact with others.  o Wear a simple mask to cover your cough.  o Wash your hands well and often.    Resources    [facility]: About COVID-19: [email or web address]    CDC: What to Do If You're Sick: www.cdc.gov/coronavirus/2019-ncov/about/steps-when-sick.html    CDC: Ending Home Isolation: www.cdc.gov/coronavirus/2019-ncov/hcp/disposition-in-home-patients.html     CDC: Caring for Someone: www.cdc.gov/coronavirus/2019-ncov/if-you-are-sick/care-for-someone.html     [facility]: Interim Guidance for Hospital Discharge to Home: www.[facility].mn./diseases/coronavirus/hcp/hospdischarge.pdf    [facility] clinical trials (COVID-19 research studies): clinicalaffairs.[facility]-clinical-trials     Below are the COVID-19 hotlines at the [place] [facility] ([facility]). Interpreters are available.   o For health questions: Call 999-316-3356 or 1-547.332.2242 (7 a.m. to 7 p.m.)  o For questions about schools and childcare: Call 065-012-1558 or 1-427.135.3331 (7 a.m. to 7 p.m.)
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
## 2021-06-10 NOTE — PATIENT INSTRUCTIONS - HE
Surgery Date 9/15/20    Surgery Time 1145  ( Arrive at the hospital two hours prior to your surgery and 1 hour prior at the surgery center. Check in at the . The only exception is if you are scheduled for surgery at 7am, you should arrive at 5:30am)    IF YOU NEED TO RESCHEDULE OR CANCEL YOUR SURGERY FOR ANY REASON PLEASE CALL THE CLINIC AS SOON AS POSSIBLE -[phone].    Admission Type: Outpatient    Surgeon: Dr Saba    Surgery Procedure: Creation AVF upper extremity right arm basilica vein fistula possible transposition    Surgery Location: [facility],[address], Main Admitting      Anticoagulation Schedule    Aspirin: OK to continue taking      Additional Information: If you use a CPAP machine for sleep apnea please bring it with you for surgery. We will want to monitor your breathing using your normal equipment.     HOSPITAL AND SURGERY CENTER INFORMATION    We need to know a lot of information about you before surgery.     1-5 Days Before:     A nurse will call you for a pre-screening interview. The phone call with the nurse will be much faster and easier if you  Have organized your information prior to the call.     Please have the following information available:    Preoperative Exam completed and faxed to our office    Primary care provider's and any specialty providers' contact information available. .    If requested by your primary care provider, have any heart and lung exams at least 3 days before surgery.    Have a complete and accurate list of medications available.     Have a list of your allergies/sensitivities and reactions    Know your health history, surgical history, and medical problems    Know any anesthesia issues with you or within your family.     BE SURE TO NOTIFY US IF YOU ARE TAKING ANY BLOOD THINNING AGENTS: Coumadin, Plavix, Aspirin, Xarelto, Eliquis    Someone planned to bring you and stay with you after the surgery    Day Before Surgery    1. STOP Smoking  and Drinking: It is important to stop smoking and drinking at least 24 hours before surgery. Smoking and drinking may cause complications in your recovery from anesthesia and may lengthen the healing process.    2. Pack for your hospital stay: If it will be required for you to stay at the hospital after surgery, bring personal items such as a robe, slippers, pajamas, additional clothing and toiletries. Don't forget:    List of medication    Eyeglass case or contact case with solution. You cannot wear contacts during surgery    Copies of your physical exam , lab results and EKG    Copy of Health Care Directives, Living Will or Power of     Insurance Cards    Photo ID    CPAP machine    3. NOTHING BY MOUTH 8 HOURS BEFORE. This includes gum, hard candy, water and mints. The only exception is if you have been instructed by your doctor to take your medications with sips of water. You may rinse your mouth or brush your teeth, but do not swallow water.     4. Remove Nail Polish  Day of Surgery    1. Medications- Take as indicated with sips of water     2. Wear comfortable loose fitting clothes. Wear your glasses-Not contacts. Do not wear jewelry and remove body piercing's. Surgery may be cancelled if they are not removed.     3. If same day surgery-Have a someone come with you to surgery that can help you understand the surgeon's instructions, drive you home and stay with you overnight the first night.     4. A nurse will call you at home within 72 hours following surgery to see how you are doing. Our nurses and doctors will discuss recovery with you.        The surgery scheduler Yesenia Simpson at [phone] will contact you to schedule if you were not scheduled today.     You will need a preop physical within 30 days before surgery with your primary care provider. Please note if you do not get a complete History and Physical your surgery will be cancelled.   Please contact your primary to schedule.     A nurse  should contact you from the hospital 1-2 days before surgery to review with you.    If you would like a Good Brittney Estimate for your upcoming service/procedure contact Cost of Care Estimates at [phone], advocates are available Monday through Friday 8am - 5pm. You may also submit a request online at [email or web address]    [facility]  [address]  [place]  [address]     [phone] for facility charge    Anesthesiology charge  [phone]          Please don't hesitate to call us with any additional question or problems  Our number is [phone]    COVID test I sscheduled on 9/11 at 10:40 at the [facility]     Instructions for Patients Scheduled for Vascular or Podiatry Procedures During the COVID 19 Pandemic    Your Provider has determined that your condition warrants going ahead with your procedure at this time.  You will need to be tested for COVID19 within 72 hours of your procedure. We highly encourage patients to get tested for COVID-19 at one of our designated [facility] testing sites. We process the tests in our lab, which allows us to get the results quickly. If you choose to get tested at a non-[facility] location, you will need to contact your primary care provider to make those arrangements and ensure the results are available to your surgeon before you are arriving for your procedure. If we do not receive the results in time, your procedure may be postponed or canceled.  Please make sure your test is collected 3-days prior to your procedure date.  The results will need to get faxed to [phone].  After testing, you will need to remain in self-quarantine until your procedure.  If you are notified of a positive COVID-19 result; you will need to call your provider for further recommendations.    Please call [phone] and press option 2 to speak to a nurse.  If the test is positive; DO NOT  PRESENT FOR YOUR PROCEDURE until you have been given further instructions.  You will not be called with negative results so arrive as instructed unless otherwise notified.  Don't:    Don't invite visitors or friends into your home.    Don't leave your home unless absolutely necessary.    Don't share utensils and other household items with others in the home.  Do:    Wash your hands regularly with soap and water and use hand  with at least 60% alcohol if you don't have easy access to soap and water.     Disinfect surface areas daily, including doorknobs, electronics - especially phones, laptops and other devices.     Wash utensils and other items thoroughly.     Separate yourself from others in the household as best you can, including pets.    Keep your hands away from your face.     Practice all other prevention tips the CDC recommends, including covering your coughs and sneezes with a tissue or your sleeve and immediately throwing the tissue into the trash and washing your hands.    Call your hospital if you develop the following signs before your surgery:    Fever.    Cough.    Shortness of breath.    Sore throat.    Runny or stuffy nose.    Muscle or body aches.    Headaches.    Fatigue.    Vomiting and diarrhea.    These steps will help keep you & your family, other patients, and hospital staff safe from COVID19.

## 2021-06-10 NOTE — TELEPHONE ENCOUNTER
Left message to discuss pts upcoming surgery with daughter. Also spoke with Terrence and he stated he will have her call back. Need to discuss f/u scheduling and what time his dialysis is.

## 2021-06-10 NOTE — PROGRESS NOTES
"Terrence Zavala is a 70 y.o. male who is being evaluated via a billable telephone visit.      The patient has been notified of following:     \"This telephone visit will be conducted via a call between you and your physician/provider. We have found that certain health care needs can be provided without the need for a physical exam.  This service lets us provide the care you need with a short phone conversation.  If a prescription is necessary we can send it directly to your pharmacy.  If lab work is needed we can place an order for that and you can then stop by our lab to have the test done at a later time.    Telephone visits are billed at different rates depending on your insurance coverage. During this emergency period, for some insurers they may be billed the same as an in-person visit.  Please reach out to your insurance provider with any questions.    If during the course of the call the physician/provider feels a telephone visit is not appropriate, you will not be charged for this service.\"    Patient has given verbal consent to a Telephone visit? Yes    Patient would like to receive their AVS by AVS Preference: Mail a copy.    ESRD, dialysis access. Vein mapping comp 8/11; dialysis MWF.       Jessie Kumar RN    "

## 2021-06-11 NOTE — TELEPHONE ENCOUNTER
RN cannot approve Refill Request    RN can NOT refill this medication med is not covered by policy/route to provider. Last office visit: 3/12/2020 Jules Younger MD Last Physical: Visit date not found Last MTM visit: Visit date not found Last visit same specialty: 3/12/2020 Jules Younger MD.  Next visit within 3 mo: Visit date not found  Next physical within 3 mo: Visit date not found      Angeline Dewey, [facility] Triage/Med Refill 9/5/2020    Requested Prescriptions   Pending Prescriptions Disp Refills     brimonidine (ALPHAGAN) 0.2 % ophthalmic solution [Pharmacy Med Name: BRIMONIDINE TARTRATE 0.2% SOLN] 5 mL 0     Sig: PLACE 1 DROP INTO BOTH EYES TWICE DAILY       There is no refill protocol information for this order

## 2021-06-11 NOTE — TELEPHONE ENCOUNTER
New Appointment Needed  What is the reason for the visit:    Pre-Op Appt Request  When is the surgery? :  9/22/2020  Where is the surgery?:   [facility]  Who is the surgeon? :  Dr Murray  What type of surgery is being done?: Fistula  Provider Preference: PCP only  How soon do you need to be seen?: before 9/22, patient cancelled his appointment for today due to not feeling well after dialysis. Prefers 9/17 as he doesn't have dialysis that day.  Waitlist offered?: No  Okay to leave a detailed message:  Yes

## 2021-06-11 NOTE — TELEPHONE ENCOUNTER
Patient rescheduled to 9/24/2020. Daughter said they will be pushing out the surgery date as well.  Tiarra Moura CMA ............... 2:27 PM, 09/16/20

## 2021-06-11 NOTE — TELEPHONE ENCOUNTER
RN cannot approve Refill Request    RN can NOT refill this medication med is not covered by policy/route to provider. Last office visit: 3/12/2020 Jules Younger MD Last Physical: Visit date not found Last MTM visit: Visit date not found Last visit same specialty: 3/12/2020 Jules Younger MD.  Next visit within 3 mo: Visit date not found  Next physical within 3 mo: Visit date not found      Dipti Farah, [facility] Triage/Med Refill 9/8/2020    Requested Prescriptions   Pending Prescriptions Disp Refills     latanoprost (XALATAN) 0.005 % ophthalmic solution 2.5 mL 0       There is no refill protocol information for this order

## 2021-06-11 NOTE — TELEPHONE ENCOUNTER
Attempted to call and schedule a pre-op for the pt, was on hold for 30 mins. Message sent to Dr. Flor to see if pre-op can be schedule without COVID results.
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
## 2021-06-11 NOTE — TELEPHONE ENCOUNTER
Pre-op scheduled tomorrow with PCP. Daughter updated as well as COVID test and follow-up date and times. She had no further questions.

## 2021-06-11 NOTE — TELEPHONE ENCOUNTER
Spoke to daughter and she wants surgery rescheduled. Preop scheduled for the 24th. Dtr wants surgery on a tues/thur.     R/S for Tuesday OCT 6th at noon.  Covid rescheduled for the 3rd.     Follow ups rescheduled also.    Pt prefers [facility] when possible.     Itinerary also mailed out to pt with all appts dates and times.

## 2021-06-11 NOTE — TELEPHONE ENCOUNTER
Please advise daughter Fina. Patient is scheduled for surgery with RF on 09/22/20. [facility] stated they would not schedule a pre-op px until they got the results of his 09/19/20 pre-procedure Covid test and suggested he move the surgery date out.    He is asymptomatic but when she called to schedule the H&P she answered that he is fatigued but this is due to Dialysis. She understands that the Covid test is for surgery and needs to be done within 72 hours of surgery but she could not get Dr. Younger's office to schedule the pre-op px. She asked for assistance in reaching out to [facility] to schedule this Tuesday or Thursday. (Dialysis M-W-F) Or reschedule surgery if not possible.
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
## 2021-06-12 NOTE — ANESTHESIA PROCEDURE NOTES
Peripheral Block    Patient location during procedure: pre-op  Start time: 10/6/2020 10:45 AM  End time: 10/6/2020 10:55 AM  surgical anesthesia  Staffing:  Performing  Anesthesiologist: Jaylon Lauren MD  Preanesthetic Checklist  Completed: patient identified, site marked, risks, benefits, and alternatives discussed, timeout performed, consent obtained, airway assessed, oxygen available, suction available, emergency drugs available and hand hygiene performed  Peripheral Block  Block type: brachial plexus, supraclavicular  Prep: ChloraPrep  Patient position: supine  Patient monitoring: cardiac monitor, continuous pulse oximetry, heart rate and blood pressure  Laterality: right  Injection technique: ultrasound guided    Ultrasound used to visualize needle placement in proximity to nerve being blocked: yes   US used to visualize anesthetic spread  Visualized anatomic structures normal  No Pathological Findings  Permanent ultrasound image captured for medical record  Sterile gel and probe cover used for ultrasound.  Needle  Needle type: echogenic   Needle gauge: 20G  Needle length: 4 in  no peripheral nerve catheter placed  Assessment  Injection assessment: no difficulty with injection, negative aspiration for heme, no paresthesia on injection and incremental injection

## 2021-06-12 NOTE — ANESTHESIA PREPROCEDURE EVALUATION
Anesthesia Evaluation      No history of anesthetic complications     Airway   Mallampati: III  Neck ROM: full   Pulmonary - negative ROS    breath sounds clear to auscultation                         Cardiovascular - normal exam  Exercise tolerance: < 4 METS  (+) hypertension well controlled, CAD, , hypercholesterolemia,     Rhythm: regular  Rate: normal,         Neuro/Psych    (+) neuromuscular disease,      Endo/Other    (+) diabetes mellitus type 2 poorly controlled,      Comments: Hyperparathyroidism  Anemia  Latent TB  Glaucoma      GI/Hepatic/Renal    (+)   chronic renal disease dialysis, last dialysis date: 10/5/2020,           Dental    (+) poor dentition                       Anesthesia Plan  Planned anesthetic: peripheral nerve block and MAC    ASA 3     Anesthetic plan and risks discussed with: patient and  services used  Anesthesia plan special considerations: antiemetics,   Post-op plan: routine recovery
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
## 2021-06-12 NOTE — ANESTHESIA POSTPROCEDURE EVALUATION
Patient: Terrence Zavala  Procedure(s):  CREATION, ARTERIOVENOUS FISTULA WITH GRAFT, RIGHT ARM (Right)  Anesthesia type: regional    Patient location: Phase II Recovery  Last vitals:   Vitals Value Taken Time   /57 10/06/20 1400   Temp 36.8  C (98.2  F) 10/06/20 1320   Pulse 73 10/06/20 1411   Resp 15 10/06/20 1400   SpO2 74 % 10/06/20 1411   Vitals shown include unvalidated device data.  Post vital signs: stable  Level of consciousness: awake and responds to simple questions  Post-anesthesia pain: pain controlled  Post-anesthesia nausea and vomiting: no  Pulmonary: unassisted, return to baseline  Cardiovascular: stable and blood pressure at baseline  Hydration: adequate  Anesthetic events: no    QCDR Measures:  ASA# 11 - Hanh-op Cardiac Arrest: ASA11B - Patient did NOT experience unanticipated cardiac arrest  ASA# 12 - Hanh-op Mortality Rate: ASA12B - Patient did NOT die  ASA# 13 - PACU Re-Intubation Rate: ASA13B - Patient did NOT require a new airway mgmt  ASA# 10 - Composite Anes Safety: ASA10A - No serious adverse event    Additional Notes:

## 2021-06-12 NOTE — ANESTHESIA CARE TRANSFER NOTE
Last vitals:   Vitals:    10/06/20 1325   BP: (!) 87/54   Pulse: 80   Resp:    Temp:    SpO2: 100%     Patient's level of consciousness is drowsy  Spontaneous respirations: yes  Maintains airway independently: yes  Dentition unchanged: yes  Oropharynx: oropharynx clear of all foreign objects    QCDR Measures:  ASA# 20 - Surgical Safety Checklist: WHO surgical safety checklist completed prior to induction    PQRS# 430 - Adult PONV Prevention: 4558F - Pt received => 2 anti-emetic agents (different classes) preop & intraop  ASA# 8 - Peds PONV Prevention: NA - Not pediatric patient, not GA or 2 or more risk factors NOT present  PQRS# 424 - Hanh-op Temp Management: 4559F - At least one body temp DOCUMENTED => 35.5C or 95.9F within required timeframe  PQRS# 426 - PACU Transfer Protocol: - Transfer of care checklist used  ASA# 14 - Acute Post-op Pain: ASA14B - Patient did NOT experience pain >= 7 out of 10

## 2021-06-12 NOTE — TELEPHONE ENCOUNTER
"Caller is daughter ; states patient has Covid-19;  Finished dialysis today and has breathing difficulty; \"he huffing next to me\"  Attempted to speak with patient directly but telelphone connectin is poor. Unable to fully assess whether breathing difficulty is new or old problem and any further information regarding fever cough or chest pain.  Advised to proceed to ED   Katarzyna Quevedo RN  FNA       Reason for Disposition    MODERATE difficulty breathing (e.g., speaks in phrases, SOB even at rest, pulse 100-120)    Additional Information    Negative: SEVERE difficulty breathing (e.g., struggling for each breath, speaks in single words)    Negative: Difficult to awaken or acting confused (e.g., disoriented, slurred speech)    Negative: Bluish (or gray) lips or face now    Negative: Shock suspected (e.g., cold/pale/clammy skin, too weak to stand, low BP, rapid pulse)    Negative: Sounds like a life-threatening emergency to the triager    Negative: [1] COVID-19 exposure AND [2] no symptoms    Negative: COVID-19 and Breastfeeding, questions about    Negative: [1] Adult with possible COVID-19 symptoms AND [2] triager concerned about severity of symptoms or other causes    Protocols used: CORONAVIRUS (COVID-19) DIAGNOSED OR [id number] 8.4.20      "

## 2021-06-12 NOTE — ANESTHESIA PROCEDURE NOTES
Peripheral Block    Patient location during procedure: pre-op  Start time: 10/6/2020 10:55 AM  End time: 10/6/2020 11:56 AM  surgical anesthesia  Staffing:  Performing  Anesthesiologist: Jaylon Lauren MD  Preanesthetic Checklist  Completed: patient identified, site marked, risks, benefits, and alternatives discussed, timeout performed, consent obtained, airway assessed, oxygen available, suction available, emergency drugs available and hand hygiene performed  Peripheral Block  Block type: other, intercostobrachial and medial brachial  Prep: ChloraPrep  Patient position: supine  Patient monitoring: cardiac monitor, continuous pulse oximetry, heart rate and blood pressure  Laterality: right              Needle  Needle type: short-bevel   Needle gauge: 25 G  Needle length: 2 in  no peripheral nerve catheter placed  Assessment  Injection assessment: no difficulty with injection, negative aspiration for heme, no paresthesia on injection and incremental injection

## 2021-06-12 NOTE — TELEPHONE ENCOUNTER
"10/6 - dialysis fistula - MD prescribed dilaudid for pain. This morning he took his regular medication with the pain medication and then went to dialyze. They had to give meds x2 to bring BP up. Has been tired since then - and is still in pain. 88/63 currently. Took dialudid 5 mins ago. While on phone 96/58 and HR 75. Advised continue to monitor BP tonight. Call back if SBP < 80 or if patient develops any symptoms of lightheadedness, weakness, or dizziness.     Warm transferred to scheduling for PCP - what do you recommend for pain control?    Yara Robin RN, Triage Nurse Advisor    Reason for Disposition    [1] Fall in systolic BP > 20 mm Hg from normal AND [2] NOT dizzy, lightheaded, or weak    Additional Information    Negative: Started suddenly after an allergic medicine, an allergic food, or bee sting    Negative: Shock suspected (e.g., cold/pale/clammy skin, too weak to stand, low BP, rapid pulse)    Negative: Difficult to awaken or acting confused (e.g., disoriented, slurred speech)    Negative: Fainted    Negative: [1] Systolic BP < 90 AND [2] dizzy, lightheaded, or weak    Negative: Chest pain    Negative: Bleeding (e.g., vomiting blood, rectal bleeding or tarry stools, severe vaginal bleeding)(Exception: fainted from sight of small amount of blood; small cut or abrasion)    Negative: Extra heart beats or heart is beating fast  (i.e., \"palpitations\")    Negative: Sounds like a life-threatening emergency to the triager    Negative: [1] Systolic BP < 80 AND [2] NOT dizzy, lightheaded or weak    Negative: Abdominal pain    Negative: Fever > 100.5 F (38.1 C)    Negative: Major surgery in the past month    Negative: [1] Drinking very little AND [2] dehydration suspected (e.g., no urine > 12 hours, very dry mouth, very lightheaded)    Negative: [1] Fall in systolic BP > 20 mm Hg from normal AND [2] dizzy, lightheaded, or weak    Negative: Patient sounds very sick or weak to the triager    Negative: [1] Systolic BP " < 90 AND [2] NOT dizzy, lightheaded or weak    Negative: [1] Systolic BP  AND [2] taking blood pressure medications AND [3] dizzy, lightheaded or weak    Negative: [1] Systolic BP  AND [2] taking blood pressure medications AND [3] NOT dizzy, lightheaded or weak    Protocols used: LOW BLOOD PRESSURE-A-AH
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
## 2021-06-12 NOTE — PROGRESS NOTES
US 10/27.CREATION, ARTERIOVENOUS FISTULA, UPPER EXTREMITY- right arm basilic vein fistula - possible transposition 10/6.    Daughter present. No  needed.

## 2021-06-12 NOTE — PROGRESS NOTES
Patient status post right brachial to axillary dialysis graft placement.  Inadequate caliber veins for fistula.  Initially planned basilic vein fistula and did very short in the mid upper arm and was not a good target.    Operation was uneventful and patient has done well.  No major pain issues and is completely healed at this point.    Vitals:    10/29/20 1220   BP: 90/52   Pulse: 90   Resp: 18   Temp: 98.2  F (36.8  C)     Nicely healed incisions.  Good graft thrill and pulse.    Duplex shows widely patent graft with excellent flow volumes.    Impression and plan: Nice early outcome post AV graft placement.  Will have sutures removed from the antecubital fossa level.  We will allow him to have his graft accessed by the dialysis center starting on November 6 with me 1 month from the time of placement.  Assuming things go well dialysis center can have arrangements made for removal of his PermCath.  I will see him on an as-needed basis.

## 2021-06-13 NOTE — ANESTHESIA PROCEDURE NOTES
Emergent Intubation    Date/Time: 11/17/2020 11:45 AM    CRNA: Sarah Kebede CRNA  Indications: respiratory distress  Medication administration time:11/17/2020 11:45 AMRoute: oral  Nasal Laterality: right  Technique: direct (without difficulty)  Laryngoscope size: glidescope Mac 4.  Tube size: 8.5 mm  Tube type: cuffed  Cuff inflated: yes  Level of Difficulty: 0ETT to lip: 25 cm  Tube secured with: ETT flores  ETCO2 = Yes  SaO2 %: 96    Sign out given. CXR and sedation per primary care team.

## 2021-06-16 PROBLEM — U07.1 PNEUMONIA DUE TO 2019 NOVEL CORONAVIRUS: Status: ACTIVE | Noted: 2020-01-01

## 2021-06-16 PROBLEM — R94.31 ABNORMAL ELECTROCARDIOGRAM: Status: ACTIVE | Noted: 2020-01-01

## 2021-06-16 PROBLEM — Z22.7 LATENT TUBERCULOSIS: Status: ACTIVE | Noted: 2020-01-01

## 2021-06-16 PROBLEM — N18.6 ANEMIA DUE TO CHRONIC KIDNEY DISEASE, ON CHRONIC DIALYSIS (H): Status: ACTIVE | Noted: 2020-01-01

## 2021-06-16 PROBLEM — N18.6 ESRD (END STAGE RENAL DISEASE) ON DIALYSIS (H): Status: ACTIVE | Noted: 2020-01-01

## 2021-06-16 PROBLEM — J80 ACUTE RESPIRATORY DISTRESS SYNDROME (ARDS) DUE TO COVID-19 VIRUS (H): Status: ACTIVE | Noted: 2020-01-01

## 2021-06-16 PROBLEM — N13.8 BENIGN PROSTATIC HYPERPLASIA WITH URINARY OBSTRUCTION: Status: ACTIVE | Noted: 2017-04-17

## 2021-06-16 PROBLEM — I95.1 ORTHOSTATIC HYPOTENSION: Status: ACTIVE | Noted: 2020-01-01

## 2021-06-16 PROBLEM — J12.82 PNEUMONIA DUE TO 2019 NOVEL CORONAVIRUS: Status: ACTIVE | Noted: 2020-01-01

## 2021-06-16 PROBLEM — E87.20 METABOLIC ACIDOSIS: Status: ACTIVE | Noted: 2020-01-01

## 2021-06-16 PROBLEM — Z99.2 ESRD (END STAGE RENAL DISEASE) ON DIALYSIS (H): Status: ACTIVE | Noted: 2020-01-01

## 2021-06-16 PROBLEM — N40.1 BENIGN PROSTATIC HYPERPLASIA WITH URINARY OBSTRUCTION: Status: ACTIVE | Noted: 2017-04-17

## 2021-06-16 PROBLEM — I51.89 DIASTOLIC DYSFUNCTION: Status: ACTIVE | Noted: 2019-01-15

## 2021-06-16 PROBLEM — Z99.2 ESRD NEEDING DIALYSIS (H): Status: ACTIVE | Noted: 2020-01-01

## 2021-06-16 PROBLEM — Z99.2: Status: ACTIVE | Noted: 2020-01-01

## 2021-06-16 PROBLEM — D63.1 ANEMIA DUE TO CHRONIC KIDNEY DISEASE, ON CHRONIC DIALYSIS (H): Status: ACTIVE | Noted: 2020-01-01

## 2021-06-16 PROBLEM — U07.1 ACUTE RESPIRATORY DISTRESS SYNDROME (ARDS) DUE TO COVID-19 VIRUS (H): Status: ACTIVE | Noted: 2020-01-01

## 2021-06-16 PROBLEM — Z99.2 ANEMIA DUE TO CHRONIC KIDNEY DISEASE, ON CHRONIC DIALYSIS (H): Status: ACTIVE | Noted: 2020-01-01

## 2021-06-16 PROBLEM — K59.09 OTHER CONSTIPATION: Status: ACTIVE | Noted: 2020-01-01

## 2021-06-16 PROBLEM — E83.39 HYPERPHOSPHATEMIA: Status: ACTIVE | Noted: 2020-01-01

## 2021-06-16 PROBLEM — N18.6 ESRD NEEDING DIALYSIS (H): Status: ACTIVE | Noted: 2020-01-01

## 2021-06-19 NOTE — LETTER
Letter by Bel Sánchez at      Author: Bel Sánchez Service: -- Author Type: --    Filed:  Encounter Date: 6/12/2019 Status: (Other)        [facility] PATIENT ACCESS  [address] [facility] [id number] [phone]         Terrence Zavala  [address]  [address]  [place] [address]        06/12/19    Dear Terrence Zavala,     At [facility] we care about your health and well-being. Your primary care provider is committed to ensuring you receive high quality care and has chosen a network of specialists to assist in providing that care. Recently Dr. Isbell referred you to orthopedics for specialty care.     It is important to your overall health to follow through with the recommendation from your provider. Please call [facility] at [phone] at your earliest convenience for assistance in scheduling an appointment. If you have already scheduled this appointment, please disregard this notice. Thank you for choosing [facility] for your healthcare needs.       Sincerely,   [facility] Specialty Scheduling

## 2021-06-21 NOTE — LETTER
Letter by Jules Younger MD at      Author: Jules Younger MD Service: -- Author Type: --    Filed:  Encounter Date: 11/1/2020 Status: (Other)         November 2, 2020     Patient: Terrence Zavala   YOB: 1950   Date of Visit: 11/1/2020       To Whom It May Concern:    Mr. Terrence Zavala is my patient.    He has medical conditions that make it very difficult for him to climb stairs. Furthermore, because of his medical conditions, I would also be worried about his risk for fall and injury.    I would support his moving to an apartment that does not require him to climb stairs.    If you have any questions or concerns, please don't hesitate to call.    Sincerely,        Electronically signed by Jules Younger MD

## 2021-07-03 NOTE — ADDENDUM NOTE
Addendum Note by Jessie Ruelas RN at 8/13/2020 12:45 PM     Author: Jessie Ruelas RN Service: -- Author Type: Registered Nurse    Filed: 8/13/2020  3:49 PM Encounter Date: 8/13/2020 Status: Signed    : Jessie Ruelas RN (Registered Nurse)    Addended by: JESSIE RUELAS on: 8/13/2020 03:49 PM        Modules accepted: Orders

## 2021-07-03 NOTE — ADDENDUM NOTE
Addendum Note by Graham Cummings, RN at 8/14/2020  2:48 PM     Author: Graham Cummings RN Service: -- Author Type: Registered Nurse    Filed: 9/18/2020  9:36 AM Encounter Date: 8/14/2020 Status: Signed    : Graham Cummings RN (Registered Nurse)    Addended by: GRAHAM CUMMINGS on: 9/18/2020 09:36 AM        Modules accepted: Orders

## 2021-07-14 PROBLEM — N18.5 STAGE 5 CHRONIC KIDNEY DISEASE (H): Status: RESOLVED | Noted: 2019-04-22 | Resolved: 2020-01-01
